# Patient Record
Sex: FEMALE | Race: WHITE | NOT HISPANIC OR LATINO | Employment: FULL TIME | URBAN - METROPOLITAN AREA
[De-identification: names, ages, dates, MRNs, and addresses within clinical notes are randomized per-mention and may not be internally consistent; named-entity substitution may affect disease eponyms.]

---

## 2018-08-14 ENCOUNTER — OFFICE VISIT (OUTPATIENT)
Dept: URGENT CARE | Facility: CLINIC | Age: 58
End: 2018-08-14
Payer: COMMERCIAL

## 2018-08-14 VITALS
OXYGEN SATURATION: 95 % | RESPIRATION RATE: 22 BRPM | TEMPERATURE: 104.1 F | SYSTOLIC BLOOD PRESSURE: 140 MMHG | WEIGHT: 212 LBS | DIASTOLIC BLOOD PRESSURE: 65 MMHG | BODY MASS INDEX: 36.19 KG/M2 | HEIGHT: 64 IN | HEART RATE: 115 BPM

## 2018-08-14 DIAGNOSIS — R50.9 FEVER, UNSPECIFIED FEVER CAUSE: ICD-10-CM

## 2018-08-14 DIAGNOSIS — B34.9 VIRAL INFECTION: ICD-10-CM

## 2018-08-14 DIAGNOSIS — H65.01 RIGHT ACUTE SEROUS OTITIS MEDIA, RECURRENCE NOT SPECIFIED: Primary | ICD-10-CM

## 2018-08-14 PROCEDURE — 99213 OFFICE O/P EST LOW 20 MIN: CPT | Performed by: EMERGENCY MEDICINE

## 2018-08-14 RX ORDER — DOXYCYCLINE 100 MG/1
100 TABLET ORAL 2 TIMES DAILY
Qty: 20 TABLET | Refills: 0 | Status: SHIPPED | OUTPATIENT
Start: 2018-08-14 | End: 2018-08-24

## 2018-08-14 RX ORDER — PREDNISONE 10 MG/1
TABLET ORAL
Qty: 27 TABLET | Refills: 0 | Status: SHIPPED | OUTPATIENT
Start: 2018-08-14

## 2018-08-14 RX ORDER — TRIAMCINOLONE ACETONIDE 55 UG/1
SPRAY, METERED NASAL
COMMUNITY

## 2018-08-14 RX ORDER — CETIRIZINE HYDROCHLORIDE 10 MG/1
10 TABLET ORAL DAILY
COMMUNITY

## 2018-08-14 RX ORDER — IBUPROFEN 600 MG/1
600 TABLET ORAL ONCE
Status: COMPLETED | OUTPATIENT
Start: 2018-08-14 | End: 2018-08-14

## 2018-08-14 RX ORDER — LISINOPRIL AND HYDROCHLOROTHIAZIDE 25; 20 MG/1; MG/1
1 TABLET ORAL DAILY
COMMUNITY

## 2018-08-14 RX ORDER — OMEPRAZOLE 20 MG/1
20 CAPSULE, DELAYED RELEASE ORAL DAILY
COMMUNITY

## 2018-08-14 RX ADMIN — IBUPROFEN 600 MG: 600 TABLET ORAL at 14:12

## 2018-08-14 NOTE — PATIENT INSTRUCTIONS
You have been diagnosed with a Viral Upper Respiratory infection and your symptoms should resolve over the next 7 to 10 days with the treatments recommended today  If they do not, it is possible that you have developed a bacterial infection and you should begin taking the antibiotic prescribed at that time  If you take the antibiotic while you are still in the viral stage, you will not get better any faster, but could kill off the good germs in your body as well as make the germs in you resistant to the antibiotic  Take an expectorant - guaifenesin should be the only ingredient - during the day, and the cough suppressant (ex  Robitussin DM or Tessalon) if needed at night only  Take Zinc 12 5 to 15 mg every 2 - 3 hrs while awake for the next few days  You may take Cold Bernabe (13 3 mg of Zinc) or split a 25 mg Zinc tablet or lozenge in two or a 50 mg into four to get the proper dose  The total daily dose of Zinc should exceed 75 mg per day  Avoid nonsteroidals like Advil or Aleve while on steroids (Prednisone)      Upper Respiratory Infection   AMBULATORY CARE:   An upper respiratory infection  is also called a common cold  It can affect your nose, throat, ears, and sinuses  Common signs and symptoms include the following:  Cold symptoms are usually worst for the first 3 to 5 days  You may have any of the following:  · Runny or stuffy nose    · Sneezing and coughing    · Sore throat or hoarseness    · Red, watery, and sore eyes    · Fatigue     · Chills and fever    · Headache, body aches, or sore muscles  Seek care immediately if:   · You have chest pain or trouble breathing  Contact your healthcare provider if:   · You have a fever over 102ºF (39°C)  · Your sore throat gets worse or you see white or yellow spots in your throat  · Your symptoms get worse after 3 to 5 days or your cold is not better in 14 days  · You have a rash anywhere on your skin      · You have large, tender lumps in your neck     · You have thick, green or yellow drainage from your nose  · You cough up thick yellow, green, or bloody mucus  · You have vomiting for more than 24 hours and cannot keep fluids down  · You have a bad earache  · You have questions or concerns about your condition or care  Treatment for a cold: There is no cure for the common cold  Colds are caused by viruses and do not get better with antibiotics  Most people get better in 7 to 14 days  You may continue to cough for 2 to 3 weeks  The following may help decrease your symptoms:  · Decongestants  help reduce nasal congestion and help you breathe more easily  If you take decongestant pills, they may make you feel restless or not able to sleep  Do not use decongestant sprays for more than a few days  · Cough suppressants  help reduce coughing  Ask your healthcare provider which type of cough medicine is best for you  · NSAIDs , such as ibuprofen, help decrease swelling, pain, and fever  NSAIDs can cause stomach bleeding or kidney problems in certain people  If you take blood thinner medicine, always ask your healthcare provider if NSAIDs are safe for you  Always read the medicine label and follow directions  · Acetaminophen  decreases pain and fever  It is available without a doctor's order  Ask how much to take and how often to take it  Follow directions  Read the labels of all other medicines you are using to see if they also contain acetaminophen, or ask your doctor or pharmacist  Acetaminophen can cause liver damage if not taken correctly  Do not use more than 4 grams (4,000 milligrams) total of acetaminophen in one day  Manage your cold:   · Rest as much as possible  Slowly start to do more each day  · Drink more liquids as directed  Liquids will help thin and loosen mucus so you can cough it up  Liquids will also help prevent dehydration  Liquids that help prevent dehydration include water, fruit juice, and broth   Do not drink liquids that contain caffeine  Caffeine can increase your risk for dehydration  Ask your healthcare provider how much liquid to drink each day  · Soothe a sore throat  Gargle with warm salt water  This helps your sore throat feel better  Make salt water by dissolving ¼ teaspoon salt in 1 cup warm water  You may also suck on hard candy or throat lozenges  You may use a sore throat spray  · Use a humidifier or vaporizer  Use a cool mist humidifier or a vaporizer to increase air moisture in your home  This may make it easier for you to breathe and help decrease your cough  · Use saline nasal drops as directed  These help relieve congestion  · Apply petroleum-based jelly around the outside of your nostrils  This can decrease irritation from blowing your nose  · Do not smoke  Nicotine and other chemicals in cigarettes and cigars can make your symptoms worse  They can also cause infections such as bronchitis or pneumonia  Ask your healthcare provider for information if you currently smoke and need help to quit  E-cigarettes or smokeless tobacco still contain nicotine  Talk to your healthcare provider before you use these products  Prevent spreading your cold to others:   · Try to stay away from other people during the first 2 to 3 days of your cold when it is more easily spread  · Do not share food or drinks  · Do not share hand towels with household members  · Wash your hands often, especially after you blow your nose  Turn away from other people and cover your mouth and nose with a tissue when you sneeze or cough  Follow up with your healthcare provider as directed:  Write down your questions so you remember to ask them during your visits  © 2017 [employer] Information is for End User's use only and may not be sold, redistributed or otherwise used for commercial purposes   All illustrations and images included in CareNotes® are the copyrighted property of [employer]  or [employer]  The above information is an  only  It is not intended as medical advice for individual conditions or treatments  Talk to your doctor, nurse or pharmacist before following any medical regimen to see if it is safe and effective for you  Fever in Adults   AMBULATORY CARE:   A fever  is an increase in your body temperature  Normal body temperature is 98 6°F (37°C)  Fever is generally defined as greater than 100 4°F (38°C)  Common causes include an infection, injury, or disease such as arthritis  Other signs and symptoms may include any of the following:   · Chills and shivers     · Muscle stiffness    · Weight loss    · Night sweats    · Fever that comes and goes    · Fever that is higher in the morning  Seek care immediately if:   · Your fever does not go away or gets worse even after treatment  · You have a stiff neck and a bad headache  · You are confused  You may not be able to think clearly or remember things like you normally do  · Your heart beats faster than usual even after treatment  · You have shortness of breath or chest pain when you breathe  · You urinate small amounts or not at all  · Your skin, lips, or nails turn blue  Contact your healthcare provider if:   · You have abdominal pain or you feel bloated  · You have nausea or are vomiting  · You have pain or burning when you urinate, or you have pain in your back  · You have questions or concerns about your condition or care  Treatment for a fever  may include any of the following:  · NSAIDs , such as ibuprofen, help decrease swelling, pain, and fever  This medicine is available with or without a doctor's order  NSAIDs can cause stomach bleeding or kidney problems in certain people  If you take blood thinner medicine, always ask if NSAIDs are safe for you  Always read the medicine label and follow directions   Do not give these medicines to children under 6 months of age without direction from your child's healthcare provider  · Acetaminophen  decreases pain and fever  It is available without a doctor's order  Ask how much to take and how often to take it  Follow directions  Read the labels of all other medicines you are using to see if they also contain acetaminophen, or ask your doctor or pharmacist  Acetaminophen can cause liver damage if not taken correctly  Do not use more than 4 grams (4,000 milligrams) total of acetaminophen in one day  · Antibiotics  may be given if you have an infection caused by bacteria  · Take your medicine as directed  Contact your healthcare provider if you think your medicine is not helping or if you have side effects  Tell him of her if you are allergic to any medicine  Keep a list of the medicines, vitamins, and herbs you take  Include the amounts, and when and why you take them  Bring the list or the pill bottles to follow-up visits  Carry your medicine list with you in case of an emergency  Self-care:   · Drink more liquids as directed  A fever makes you sweat  This can increase your risk for dehydration  Liquids can help prevent dehydration  ¨ Drink at least 6 to 8 eight-ounce cups of clear liquids each day  Drink water, juice, or broth  Do not drink sports drinks  They may contain caffeine  ¨ Ask your healthcare provider if you should drink an oral rehydration solution (ORS)  An ORS has the right amounts of water, salts, and sugar you need to replace body fluids  · Dress in lightweight clothes  Shivers may be a sign that your fever is rising  Do not put extra blankets or clothes on  This may cause your fever to rise even higher  Dress in light, comfortable clothing  Use a lightweight blanket or sheet when you sleep  Change your clothes, blanket, or sheets if they get wet  · Cool yourself safely  Take a bath in cool or lukewarm water   Use an ice pack wrapped in a small towel or wet a washcloth with cool water  Place the ice pack or wet washcloth on your forehead or the back of your neck  Follow up with your healthcare provider as directed:  Write down your questions so you remember to ask them during your visits  © 2017 [employer] Information is for End User's use only and may not be sold, redistributed or otherwise used for commercial purposes  All illustrations and images included in CareNotes® are the copyrighted property of [employer]  or [employer]  The above information is an  only  It is not intended as medical advice for individual conditions or treatments  Talk to your doctor, nurse or pharmacist before following any medical regimen to see if it is safe and effective for you  Serous Otitis Media   WHAT YOU NEED TO KNOW:   Serous otitis media is fluid trapped behind your tympanic membrane (eardrum), without an ear infection  Your eardrum is in your middle ear  Serous otitis media is also called otitis media with effusion  You may have fluid in your ear for months, but it usually goes away on its own  The fluid may be in one or both ears  The fluid may cause muffled sounds, and you may feel like your ears are full  Serous otitis media may be caused by an upper respiratory infection or allergies  It is most common in the fall and early spring  DISCHARGE INSTRUCTIONS:   Return to the emergency department if:   · You have a fever  · You have a sudden loss of hearing in your affected ear  · You develop a severe headache and stiff neck  · You have a seizure  Contact your healthcare provider if:   · You have fluid draining from your ear  · You have new symptoms  · You have questions or concerns about your condition or care  Follow up with your healthcare provider as directed: Your ears will need to be checked regularly  You may need to see a specialist  Write down your questions so you remember to ask them during your visits    © 2017 [employer] Information is for End User's use only and may not be sold, redistributed or otherwise used for commercial purposes  All illustrations and images included in CareNotes® are the copyrighted property of [employer]  or [employer]  The above information is an  only  It is not intended as medical advice for individual conditions or treatments  Talk to your doctor, nurse or pharmacist before following any medical regimen to see if it is safe and effective for you

## 2018-08-14 NOTE — PROGRESS NOTES
[id number]        NAME: Cynthia Boyd is a 62 y o  female  : 1960    MRN: [id number]  DATE: 2018  TIME: 6:00 PM    Assessment and Plan   Right acute serous otitis media, recurrence not specified [H65 01]  1  Right acute serous otitis media, recurrence not specified  doxycycline (ADOXA) 100 MG tablet   2  Fever, unspecified fever cause  ibuprofen (MOTRIN) tablet 600 mg   3  Viral infection  predniSONE 10 mg tablet         Patient Instructions     Patient Instructions     You have been diagnosed with a Viral Upper Respiratory infection and your symptoms should resolve over the next 7 to 10 days with the treatments recommended today  If they do not, it is possible that you have developed a bacterial infection and you should begin taking the antibiotic prescribed at that time  If you take the antibiotic while you are still in the viral stage, you will not get better any faster, but could kill off the good germs in your body as well as make the germs in you resistant to the antibiotic  Take an expectorant - guaifenesin should be the only ingredient - during the day, and the cough suppressant (ex  Robitussin DM or Tessalon) if needed at night only  Take Zinc 12 5 to 15 mg every 2 - 3 hrs while awake for the next few days  You may take Cold Bernabe (13 3 mg of Zinc) or split a 25 mg Zinc tablet or lozenge in two or a 50 mg into four to get the proper dose  The total daily dose of Zinc should exceed 75 mg per day  Avoid nonsteroidals like Advil or Aleve while on steroids (Prednisone)      Upper Respiratory Infection   AMBULATORY CARE:   An upper respiratory infection  is also called a common cold  It can affect your nose, throat, ears, and sinuses  Common signs and symptoms include the following:  Cold symptoms are usually worst for the first 3 to 5 days   You may have any of the following:  · Runny or stuffy nose    · Sneezing and coughing    · Sore throat or hoarseness    · Red, watery, and sore eyes    · Fatigue     · Chills and fever    · Headache, body aches, or sore muscles  Seek care immediately if:   · You have chest pain or trouble breathing  Contact your healthcare provider if:   · You have a fever over 102ºF (39°C)  · Your sore throat gets worse or you see white or yellow spots in your throat  · Your symptoms get worse after 3 to 5 days or your cold is not better in 14 days  · You have a rash anywhere on your skin  · You have large, tender lumps in your neck  · You have thick, green or yellow drainage from your nose  · You cough up thick yellow, green, or bloody mucus  · You have vomiting for more than 24 hours and cannot keep fluids down  · You have a bad earache  · You have questions or concerns about your condition or care  Treatment for a cold: There is no cure for the common cold  Colds are caused by viruses and do not get better with antibiotics  Most people get better in 7 to 14 days  You may continue to cough for 2 to 3 weeks  The following may help decrease your symptoms:  · Decongestants  help reduce nasal congestion and help you breathe more easily  If you take decongestant pills, they may make you feel restless or not able to sleep  Do not use decongestant sprays for more than a few days  · Cough suppressants  help reduce coughing  Ask your healthcare provider which type of cough medicine is best for you  · NSAIDs , such as ibuprofen, help decrease swelling, pain, and fever  NSAIDs can cause stomach bleeding or kidney problems in certain people  If you take blood thinner medicine, always ask your healthcare provider if NSAIDs are safe for you  Always read the medicine label and follow directions  · Acetaminophen  decreases pain and fever  It is available without a doctor's order  Ask how much to take and how often to take it  Follow directions   Read the labels of all other medicines you are using to see if they also contain acetaminophen, or ask your doctor or pharmacist  Acetaminophen can cause liver damage if not taken correctly  Do not use more than 4 grams (4,000 milligrams) total of acetaminophen in one day  Manage your cold:   · Rest as much as possible  Slowly start to do more each day  · Drink more liquids as directed  Liquids will help thin and loosen mucus so you can cough it up  Liquids will also help prevent dehydration  Liquids that help prevent dehydration include water, fruit juice, and broth  Do not drink liquids that contain caffeine  Caffeine can increase your risk for dehydration  Ask your healthcare provider how much liquid to drink each day  · Soothe a sore throat  Gargle with warm salt water  This helps your sore throat feel better  Make salt water by dissolving ¼ teaspoon salt in 1 cup warm water  You may also suck on hard candy or throat lozenges  You may use a sore throat spray  · Use a humidifier or vaporizer  Use a cool mist humidifier or a vaporizer to increase air moisture in your home  This may make it easier for you to breathe and help decrease your cough  · Use saline nasal drops as directed  These help relieve congestion  · Apply petroleum-based jelly around the outside of your nostrils  This can decrease irritation from blowing your nose  · Do not smoke  Nicotine and other chemicals in cigarettes and cigars can make your symptoms worse  They can also cause infections such as bronchitis or pneumonia  Ask your healthcare provider for information if you currently smoke and need help to quit  E-cigarettes or smokeless tobacco still contain nicotine  Talk to your healthcare provider before you use these products  Prevent spreading your cold to others:   · Try to stay away from other people during the first 2 to 3 days of your cold when it is more easily spread  · Do not share food or drinks  · Do not share hand towels with household members      · Wash your hands often, especially after you blow your nose  Turn away from other people and cover your mouth and nose with a tissue when you sneeze or cough  Follow up with your healthcare provider as directed:  Write down your questions so you remember to ask them during your visits  © 2017 [employer] Information is for End User's use only and may not be sold, redistributed or otherwise used for commercial purposes  All illustrations and images included in CareNotes® are the copyrighted property of [employer]  or [employer]  The above information is an  only  It is not intended as medical advice for individual conditions or treatments  Talk to your doctor, nurse or pharmacist before following any medical regimen to see if it is safe and effective for you  Fever in Adults   AMBULATORY CARE:   A fever  is an increase in your body temperature  Normal body temperature is 98 6°F (37°C)  Fever is generally defined as greater than 100 4°F (38°C)  Common causes include an infection, injury, or disease such as arthritis  Other signs and symptoms may include any of the following:   · Chills and shivers     · Muscle stiffness    · Weight loss    · Night sweats    · Fever that comes and goes    · Fever that is higher in the morning  Seek care immediately if:   · Your fever does not go away or gets worse even after treatment  · You have a stiff neck and a bad headache  · You are confused  You may not be able to think clearly or remember things like you normally do  · Your heart beats faster than usual even after treatment  · You have shortness of breath or chest pain when you breathe  · You urinate small amounts or not at all  · Your skin, lips, or nails turn blue  Contact your healthcare provider if:   · You have abdominal pain or you feel bloated  · You have nausea or are vomiting  · You have pain or burning when you urinate, or you have pain in your back      · You have questions or concerns about your condition or care  Treatment for a fever  may include any of the following:  · NSAIDs , such as ibuprofen, help decrease swelling, pain, and fever  This medicine is available with or without a doctor's order  NSAIDs can cause stomach bleeding or kidney problems in certain people  If you take blood thinner medicine, always ask if NSAIDs are safe for you  Always read the medicine label and follow directions  Do not give these medicines to children under 10months of age without direction from your child's healthcare provider  · Acetaminophen  decreases pain and fever  It is available without a doctor's order  Ask how much to take and how often to take it  Follow directions  Read the labels of all other medicines you are using to see if they also contain acetaminophen, or ask your doctor or pharmacist  Acetaminophen can cause liver damage if not taken correctly  Do not use more than 4 grams (4,000 milligrams) total of acetaminophen in one day  · Antibiotics  may be given if you have an infection caused by bacteria  · Take your medicine as directed  Contact your healthcare provider if you think your medicine is not helping or if you have side effects  Tell him of her if you are allergic to any medicine  Keep a list of the medicines, vitamins, and herbs you take  Include the amounts, and when and why you take them  Bring the list or the pill bottles to follow-up visits  Carry your medicine list with you in case of an emergency  Self-care:   · Drink more liquids as directed  A fever makes you sweat  This can increase your risk for dehydration  Liquids can help prevent dehydration  ¨ Drink at least 6 to 8 eight-ounce cups of clear liquids each day  Drink water, juice, or broth  Do not drink sports drinks  They may contain caffeine  ¨ Ask your healthcare provider if you should drink an oral rehydration solution (ORS)   An ORS has the right amounts of water, salts, and sugar you need to replace body fluids  · Dress in lightweight clothes  Shivers may be a sign that your fever is rising  Do not put extra blankets or clothes on  This may cause your fever to rise even higher  Dress in light, comfortable clothing  Use a lightweight blanket or sheet when you sleep  Change your clothes, blanket, or sheets if they get wet  · Cool yourself safely  Take a bath in cool or lukewarm water  Use an ice pack wrapped in a small towel or wet a washcloth with cool water  Place the ice pack or wet washcloth on your forehead or the back of your neck  Follow up with your healthcare provider as directed:  Write down your questions so you remember to ask them during your visits  © 2017 [address] [facility] Information is for End User's use only and may not be sold, redistributed or otherwise used for commercial purposes  All illustrations and images included in CareNotes® are the copyrighted property of A [employer] M , Inc  or [employer]  The above information is an  only  It is not intended as medical advice for individual conditions or treatments  Talk to your doctor, nurse or pharmacist before following any medical regimen to see if it is safe and effective for you  Serous Otitis Media   WHAT YOU NEED TO KNOW:   Serous otitis media is fluid trapped behind your tympanic membrane (eardrum), without an ear infection  Your eardrum is in your middle ear  Serous otitis media is also called otitis media with effusion  You may have fluid in your ear for months, but it usually goes away on its own  The fluid may be in one or both ears  The fluid may cause muffled sounds, and you may feel like your ears are full  Serous otitis media may be caused by an upper respiratory infection or allergies  It is most common in the fall and early spring  DISCHARGE INSTRUCTIONS:   Return to the emergency department if:   · You have a fever      · You have a sudden loss of hearing in your affected ear  · You develop a severe headache and stiff neck  · You have a seizure  Contact your healthcare provider if:   · You have fluid draining from your ear  · You have new symptoms  · You have questions or concerns about your condition or care  Follow up with your healthcare provider as directed: Your ears will need to be checked regularly  You may need to see a specialist  Write down your questions so you remember to ask them during your visits  © 2017 [address] [employer] Information is for End User's use only and may not be sold, redistributed or otherwise used for commercial purposes  All illustrations and images included in CareNotes® are the copyrighted property of A D [employer] M , Inc  or [employer]  The above information is an  only  It is not intended as medical advice for individual conditions or treatments  Talk to your doctor, nurse or pharmacist before following any medical regimen to see if it is safe and effective for you  Follow up with PCP in 3-5 days  Proceed to  ER if symptoms worsen  Chief Complaint     Chief Complaint   Patient presents with    Sore Throat     Sore throat, right earache, dry cough, headaches, chills  Onset 2 days ago  History of Present Illness       Patient complains of sore throat, fever, chills, headache, right ear pain and generalized aches for the past 2 days  Review of Systems   Review of Systems   Constitutional: Positive for chills and fever  HENT: Positive for congestion, ear pain, rhinorrhea, sinus pressure and sore throat  Negative for ear discharge, trouble swallowing and voice change  Respiratory: Positive for cough  Negative for chest tightness, shortness of breath and wheezing  Cardiovascular: Negative for chest pain           Current Medications       Current Outpatient Prescriptions:     cetirizine (ZyrTEC) 10 mg tablet, Take 10 mg by mouth daily, Disp: , Rfl:    lisinopril-hydrochlorothiazide (PRINZIDE,ZESTORETIC) 20-25 MG per tablet, Take 1 tablet by mouth daily, Disp: , Rfl:     omeprazole (PriLOSEC) 20 mg delayed release capsule, Take 20 mg by mouth daily, Disp: , Rfl:     Triamcinolone Acetonide (NASACORT ALLERGY 24HR) 55 MCG/ACT AERO, into each nostril, Disp: , Rfl:     doxycycline (ADOXA) 100 MG tablet, Take 1 tablet (100 mg total) by mouth 2 (two) times a day for 10 days, Disp: 20 tablet, Rfl: 0    predniSONE 10 mg tablet, Take once daily all days pills on this schedule 6- 6- 5- 4- 3- 2- 1, Disp: 27 tablet, Rfl: 0  No current facility-administered medications for this visit  Current Allergies     Allergies as of 08/14/2018 - Reviewed 08/14/2018   Allergen Reaction Noted    Penicillins Hives 08/14/2018            The following portions of the patient's history were reviewed and updated as appropriate: allergies, current medications, past family history, past medical history, past social history, past surgical history and problem list      Past Medical History:   Diagnosis Date    GERD (gastroesophageal reflux disease)     Hypertension        Past Surgical History:   Procedure Laterality Date    COLONOSCOPY  2015    ENDOMETRIAL ABLATION  2008 [facility] [facility]       History reviewed  No pertinent family history  Medications have been verified  Objective   /65   Pulse (!) 115   Temp (!) 104 1 °F (40 1 °C) (Tympanic)   Resp 22   Ht 5' 4" (1 626 m)   Wt 96 2 kg (212 lb)   SpO2 95%   BMI 36 39 kg/m²        Physical Exam     Physical Exam   Constitutional: She is oriented to person, place, and time  She appears well-developed and well-nourished  No distress  HENT:   Head: Normocephalic and atraumatic  Right Ear: External ear normal  No tenderness  Tympanic membrane is not injected and not erythematous  A middle ear effusion is present  Left Ear: Tympanic membrane and external ear normal  No tenderness   Tympanic membrane is not injected and not erythematous  No middle ear effusion  Nose: Mucosal edema present  Mouth/Throat: Posterior oropharyngeal erythema present  No oropharyngeal exudate or tonsillar abscesses  Neck: Neck supple  Cardiovascular: Normal rate and regular rhythm  Pulmonary/Chest: Effort normal    Abdominal: Soft  Bowel sounds are normal    Neurological: She is alert and oriented to person, place, and time  Skin: Skin is warm and dry  Nursing note and vitals reviewed

## 2019-02-04 ENCOUNTER — OFFICE VISIT (OUTPATIENT)
Dept: URGENT CARE | Facility: CLINIC | Age: 59
End: 2019-02-04
Payer: COMMERCIAL

## 2019-02-04 VITALS
DIASTOLIC BLOOD PRESSURE: 72 MMHG | HEIGHT: 64 IN | OXYGEN SATURATION: 99 % | BODY MASS INDEX: 27.14 KG/M2 | TEMPERATURE: 97.5 F | SYSTOLIC BLOOD PRESSURE: 118 MMHG | RESPIRATION RATE: 20 BRPM | WEIGHT: 159 LBS

## 2019-02-04 DIAGNOSIS — H10.32 ACUTE BACTERIAL CONJUNCTIVITIS OF LEFT EYE: Primary | ICD-10-CM

## 2019-02-04 PROCEDURE — 99203 OFFICE O/P NEW LOW 30 MIN: CPT | Performed by: PHYSICIAN ASSISTANT

## 2019-02-04 RX ORDER — TOBRAMYCIN 3 MG/ML
1 SOLUTION/ DROPS OPHTHALMIC
Qty: 5 ML | Refills: 0 | Status: SHIPPED | OUTPATIENT
Start: 2019-02-04

## 2019-02-04 RX ORDER — METFORMIN HYDROCHLORIDE 500 MG/1
TABLET, EXTENDED RELEASE ORAL
COMMUNITY
Start: 2018-11-18

## 2019-02-04 RX ORDER — LISINOPRIL 10 MG/1
10 TABLET ORAL DAILY
COMMUNITY

## 2019-02-04 NOTE — PATIENT INSTRUCTIONS
Conjunctivitis   AMBULATORY CARE:   Conjunctivitis,  or pink eye, is inflammation of your conjunctiva  The conjunctiva is a thin tissue that covers the front of your eye and the back of your eyelids  The conjunctiva helps protect your eye and keep it moist  Conjunctivitis may be caused by bacteria, allergies, or a virus  If your conjunctivitis is caused by bacteria, it may get better on its own in about 7 days  Viral conjunctivitis can last up to 3 weeks  Common symptoms may include any of the following: You will usually have symptoms in both eyes if your conjunctivitis is caused by allergies  You may also have other allergic symptoms, such as a rash or runny nose  Symptoms will usually start in 1 eye if your conjunctivitis is caused by a virus or bacteria  · Redness in the whites of your eye    · Itching in your eye or around your eye    · Feeling like there is something in your eye    · Watery or thick, sticky discharge    · Crusty eyelids when you wake up in the morning    · Burning, stinging, or swelling in your eye    · Pain when you see bright light  Seek care immediately if:   · You have worsening eye pain  · The swelling in your eye gets worse, even after treatment  · Your vision suddenly becomes worse or you cannot see at all  Contact your healthcare provider if:   · You develop a fever and ear pain  · You have tiny bumps or spots of blood on your eye  · You have questions or concerns about your condition or care  Treatment  will depend on the cause of your conjunctivitis  You may need antibiotics or allergy medicine as a pill, eye drop, or eye ointment  Manage your symptoms:   · Apply a cool compress  Wet a washcloth with cold water and place it on your eye  This will help decrease itching and irritation  · Do not wear contact lenses  They can irritate your eye  Throw away the pair you are using and ask when you can wear them again   Use a new pair of lenses when your healthcare provider says it is okay  · Avoid irritants  Stay away from smoke filled areas  Shield your eyes from wind and sun  · Flush your eye  You may need to flush your eye with saline to help decrease your symptoms  Ask for more information on how to flush your eye  Medicines:  Treatment depends on what is causing your conjunctivitis  You may be given any of the following:  · Allergy medicine  helps decrease itchy, red, swollen eyes caused by allergies  It may be given as a pill, eye drops, or nasal spray  · Antibiotics  may be needed if your conjunctivitis is caused by bacteria  This medicine may be given as a pill, eye drops, or eye ointment  · Take your medicine as directed  Contact your healthcare provider if you think your medicine is not helping or if you have side effects  Tell him or her if you are allergic to any medicine  Keep a list of the medicines, vitamins, and herbs you take  Include the amounts, and when and why you take them  Bring the list or the pill bottles to follow-up visits  Carry your medicine list with you in case of an emergency  Prevent the spread of conjunctivitis:   · Wash your hands with soap and water often  Wash your hands before and after you touch your eyes  Also wash your hands before you prepare or eat food and after you use the bathroom or change a diaper  · Avoid allergens  Try to avoid the things that cause your allergies, such as pets, dust, or grass  · Avoid contact with others  Do not share towels or washcloths  Try to stay away from others as much as possible  Ask when you can return to work or school  · Throw away eye makeup  The bacteria that caused your conjunctivitis can stay in eye makeup  Throw away mascara and other eye makeup  © 2017 [address] [employer]  Information is for End User's use only and may not be sold, redistributed or otherwise used for commercial purposes   All illustrations and images included in [facility] are the copyrighted property of [employer]  or [employer]  The above information is an  only  It is not intended as medical advice for individual conditions or treatments  Talk to your doctor, nurse or pharmacist before following any medical regimen to see if it is safe and effective for you

## 2019-02-04 NOTE — PROGRESS NOTES
[email or web address]        NAME: Kay Mims is a 62 y o  female  : 1960    MRN: [id number]  DATE: 2019  TIME: 5:24 PM    Assessment and Plan   Acute bacterial conjunctivitis of left eye [H10 32]  1  Acute bacterial conjunctivitis of left eye  tobramycin (TOBREX) 0 3 % SOLN         Patient Instructions       Follow up with PCP in 3-5 days  Proceed to  ER if symptoms worsen  Chief Complaint     Chief Complaint   Patient presents with    Eye Pain     left eye is red and itchy since this am         History of Present Illness       Patient started with acute onset of redness and drainage from her left eye at 1:00 p m this afternoon  She was exposed to a pinkeye through a co-worker as well as her granddaughter  She denies any changes in her vision piercing pain through the eye or swelling  Review of Systems   Review of Systems   Constitutional: Negative for fever  HENT: Negative for sore throat  Eyes: Positive for discharge and redness  Negative for photophobia, pain and visual disturbance  Respiratory: Negative for cough  Gastrointestinal: Negative for nausea and vomiting  Musculoskeletal: Negative for myalgias  Skin: Negative for rash  Neurological: Negative for light-headedness  Hematological: Negative for adenopathy           Current Medications       Current Outpatient Prescriptions:     cetirizine (ZyrTEC) 10 mg tablet, Take 10 mg by mouth daily, Disp: , Rfl:     lisinopril (ZESTRIL) 10 mg tablet, Take 10 mg by mouth daily, Disp: , Rfl:     metFORMIN (GLUCOPHAGE-XR) 500 mg 24 hr tablet, TAKE 2 TABLETS EVERY DAY, Disp: , Rfl:     omeprazole (PriLOSEC) 20 mg delayed release capsule, Take 20 mg by mouth daily, Disp: , Rfl:     lisinopril-hydrochlorothiazide (PRINZIDE,ZESTORETIC) 20-25 MG per tablet, Take 1 tablet by mouth daily, Disp: , Rfl:     predniSONE 10 mg tablet, Take once daily all days pills on this schedule 6- 6- 5- 4- 3- 2- 1 (Patient not taking: Reported on 2/4/2019 ), Disp: 27 tablet, Rfl: 0    tobramycin (TOBREX) 0 3 % SOLN, Administer 1 drop into the left eye every 4 (four) hours while awake, Disp: 5 mL, Rfl: 0    Triamcinolone Acetonide (NASACORT ALLERGY 24HR) 55 MCG/ACT AERO, into each nostril, Disp: , Rfl:     Current Allergies     Allergies as of 02/04/2019 - Reviewed 02/04/2019   Allergen Reaction Noted    Penicillins Hives 08/14/2018            The following portions of the patient's history were reviewed and updated as appropriate: allergies, current medications, past family history, past medical history, past social history, past surgical history and problem list      Past Medical History:   Diagnosis Date    GERD (gastroesophageal reflux disease)     Hypertension        Past Surgical History:   Procedure Laterality Date    COLONOSCOPY  2015    ENDOMETRIAL ABLATION  2008   [address]       History reviewed  No pertinent family history  Medications have been verified  Objective   /72   Temp 97 5 °F (36 4 °C) (Tympanic)   Resp 20   Ht 5' 4" (1 626 m)   Wt 72 1 kg (159 lb)   SpO2 99%   BMI 27 29 kg/m²        Physical Exam     Physical Exam   Constitutional: She is oriented to person, place, and time  She appears well-developed and well-nourished  HENT:   Head: Normocephalic and atraumatic  Eyes: Pupils are equal, round, and reactive to light  EOM are normal    Left conjunctiva hyperemic with purulent drainage from left eye  Mild matting of the eyelashes  Neck: Neck supple  Cardiovascular: Normal rate and regular rhythm  Pulmonary/Chest: Effort normal    Lymphadenopathy:     She has no cervical adenopathy  Neurological: She is alert and oriented to person, place, and time  Skin: Skin is warm and dry  No rash noted  Psychiatric: She has a normal mood and affect  Her behavior is normal  Judgment and thought content normal    Nursing note and vitals reviewed

## 2020-01-25 ENCOUNTER — OFFICE VISIT (OUTPATIENT)
Dept: URGENT CARE | Facility: CLINIC | Age: 60
End: 2020-01-25
Payer: COMMERCIAL

## 2020-01-25 VITALS
HEIGHT: 63 IN | RESPIRATION RATE: 16 BRPM | OXYGEN SATURATION: 98 % | HEART RATE: 70 BPM | BODY MASS INDEX: 26.4 KG/M2 | SYSTOLIC BLOOD PRESSURE: 142 MMHG | DIASTOLIC BLOOD PRESSURE: 80 MMHG | WEIGHT: 149 LBS

## 2020-01-25 DIAGNOSIS — S00.03XA CONTUSION OF SCALP, INITIAL ENCOUNTER: Primary | ICD-10-CM

## 2020-01-25 DIAGNOSIS — S09.90XA HEAD TRAUMA, INITIAL ENCOUNTER: ICD-10-CM

## 2020-01-25 PROCEDURE — G0381 LEV 2 HOSP TYPE B ED VISIT: HCPCS | Performed by: EMERGENCY MEDICINE

## 2020-01-25 PROCEDURE — 99282 EMERGENCY DEPT VISIT SF MDM: CPT | Performed by: EMERGENCY MEDICINE

## 2020-01-25 NOTE — PROGRESS NOTES
[facility]        NAME: Sully Barrientos is a 61 y o  female  : 1960    MRN: [id number]  DATE: 2020  TIME: 2:27 PM    Assessment and Plan   Contusion of scalp, initial encounter [S00 03XA]  1  Contusion of scalp, initial encounter     2  Head trauma, initial encounter           Patient Instructions     Patient Instructions   1  Ice to scalp area:  10 min on 10 mins off three times daily  2  If onset of blurred vision, nausea, vomiting, instability of gait, worsening headache, go directly to ER  Contusion in Adults   WHAT YOU NEED TO KNOW:   A contusion is a bruise that appears on your skin after an injury  A bruise happens when small blood vessels tear but skin does not  When blood vessels tear, blood leaks into nearby tissue, such as soft tissue or muscle  DISCHARGE INSTRUCTIONS:   Return to the emergency department if:   · You have new trouble moving the injured area  · You have tingling or numbness in or near the injured area  · Your hand or foot below the bruise gets cold or turns pale  Contact your healthcare provider if:   · You find a new lump in the injured area  · Your symptoms do not improve with treatment after 4 to 5 days  · You have questions or concerns about your condition or care  Medicines: You may need any of the following:  · NSAIDs  help decrease swelling and pain or fever  This medicine is available with or without a doctor's order  NSAIDs can cause stomach bleeding or kidney problems in certain people  If you take blood thinner medicine, always ask your healthcare provider if NSAIDs are safe for you  Always read the medicine label and follow directions  · Prescription pain medicine  may be given  Do not wait until the pain is severe before you take your medicine  · Take your medicine as directed  Contact your healthcare provider if you think your medicine is not helping or if you have side effects   Tell him of her if you are allergic to any medicine  Keep a list of the medicines, vitamins, and herbs you take  Include the amounts, and when and why you take them  Bring the list or the pill bottles to follow-up visits  Carry your medicine list with you in case of an emergency  Follow up with your healthcare provider as directed: You may need to return within a week to check your injury again  Write down your questions so you remember to ask them during your visits  Help a contusion heal:   · Rest the injured area  or use it less than usual  If you bruised your leg or foot, you may need crutches or a cane to help you walk  This will help you keep weight off your injured body part  · Apply ice  to decrease swelling and pain  Ice may also help prevent tissue damage  Use an ice pack, or put crushed ice in a plastic bag  Cover it with a towel and place it on your bruise for 15 to 20 minutes every hour or as directed  · Use compression  to support the area and decrease swelling  Wrap an elastic bandage around the area over the bruised muscle  Make sure the bandage is not too tight  You should be able to fit 1 finger between the bandage and your skin  · Elevate (raise) your injured body part  above the level of your heart to help decrease pain and swelling  Use pillows, blankets, or rolled towels to elevate the area as often as you can  · Do not drink alcohol  as directed  Alcohol may slow healing  · Do not stretch injured muscles  right after your injury  Ask your healthcare provider when and how you may safely stretch after your injury  Gentle stretches can help increase your flexibility  · Do not massage the area or put heating pads  on the bruise right after your injury  Heat and massage may slow healing  Your healthcare provider may tell you to apply heat after several days  At that time, heat will start to help the injury heal   Prevent another contusion:   · Stretch and warm up before you play sports or exercise      · Wear protective gear when you play sports  Examples are shin guards and padding  · If you begin a new physical activity, start slowly to give your body a chance to adjust   © 2017 [employer] Information is for End User's use only and may not be sold, redistributed or otherwise used for commercial purposes  All illustrations and images included in CareNotes® are the copyrighted property of [employer]  or [employer]  The above information is an  only  It is not intended as medical advice for individual conditions or treatments  Talk to your doctor, nurse or pharmacist before following any medical regimen to see if it is safe and effective for you  Follow up with PCP in 3-5 days  Proceed to  ER if symptoms worsen  Chief Complaint     Chief Complaint   Patient presents with    Headache         History of Present Illness       This is a 15-year-old female who presents with headache and the left occipital area which started after she hit her head on the corner edge of her television about 6 days ago  She states she still has a minimal swelling in the area when she touches it it hurts  Her headache has much improved although she still has a headache today at the level of 2  Patient denies nausea vomiting  She had no loss of consciousness at the time of the injury  She states that Tylenol does help the pain  Review of Systems   Review of Systems   Constitutional: Negative for fever  HENT: Negative for congestion  Eyes: Negative for discharge and redness  Respiratory: Negative for cough  Gastrointestinal: Negative for nausea and vomiting  Musculoskeletal: Negative for arthralgias, myalgias, neck pain and neck stiffness  Skin: Negative for rash  Neurological: Positive for headaches  Negative for dizziness, speech difficulty, weakness, light-headedness and numbness           Current Medications       Current Outpatient Medications:    cetirizine (ZyrTEC) 10 mg tablet, Take 10 mg by mouth daily, Disp: , Rfl:     metFORMIN (GLUCOPHAGE-XR) 500 mg 24 hr tablet, TAKE 2 TABLETS EVERY DAY, Disp: , Rfl:     omeprazole (PriLOSEC) 20 mg delayed release capsule, Take 20 mg by mouth daily, Disp: , Rfl:     Triamcinolone Acetonide (NASACORT ALLERGY 24HR) 55 MCG/ACT AERO, into each nostril, Disp: , Rfl:     lisinopril (ZESTRIL) 10 mg tablet, Take 10 mg by mouth daily, Disp: , Rfl:     lisinopril-hydrochlorothiazide (PRINZIDE,ZESTORETIC) 20-25 MG per tablet, Take 1 tablet by mouth daily, Disp: , Rfl:     predniSONE 10 mg tablet, Take once daily all days pills on this schedule 6- 6- 5- 4- 3- 2- 1 (Patient not taking: Reported on 2/4/2019 ), Disp: 27 tablet, Rfl: 0    tobramycin (TOBREX) 0 3 % SOLN, Administer 1 drop into the left eye every 4 (four) hours while awake (Patient not taking: Reported on 1/25/2020), Disp: 5 mL, Rfl: 0    Current Allergies     Allergies as of 01/25/2020 - Reviewed 01/25/2020   Allergen Reaction Noted    Penicillins Hives 08/14/2018            The following portions of the patient's history were reviewed and updated as appropriate: allergies, current medications, past family history, past medical history, past social history, past surgical history and problem list      Past Medical History:   Diagnosis Date    GERD (gastroesophageal reflux disease)     Hypertension        Past Surgical History:   Procedure Laterality Date    COLONOSCOPY  2015    ENDOMETRIAL ABLATION  2008 [facility]       History reviewed  No pertinent family history  Medications have been verified  Objective   /80 (BP Location: Left arm, Patient Position: Sitting, Cuff Size: Standard)   Pulse 70   Resp 16   Ht 5' 3" (1 6 m)   Wt 67 6 kg (149 lb)   SpO2 98%   BMI 26 39 kg/m²        Physical Exam     Physical Exam   Constitutional: She is oriented to person, place, and time  She appears well-developed and well-nourished  HENT:   Head: Normocephalic and atraumatic  Right Ear: External ear normal    Left Ear: External ear normal    Nose: Nose normal    Mouth/Throat: Oropharynx is clear and moist    There is a slight swelling to the scalp on the left posterior parietal area which is tender to palpation and reproduces the patient's pain  There is no bruising  There are no abrasions  Eyes: Pupils are equal, round, and reactive to light  Conjunctivae and EOM are normal    Bilateral funduscopic exam is normal    Neck: Normal range of motion  Neck supple  Cardiovascular: Normal rate, regular rhythm and normal heart sounds  No murmur heard  Pulmonary/Chest: Effort normal and breath sounds normal  She has no wheezes  She has no rales  Abdominal: Soft  She exhibits no distension and no mass  There is no tenderness  There is no guarding  Musculoskeletal: Normal range of motion  Lymphadenopathy:     She has no cervical adenopathy  Neurological: She is alert and oriented to person, place, and time  No cranial nerve deficit  She exhibits normal muscle tone  Coordination normal    Skin: Skin is warm and dry  No rash noted  No erythema  Psychiatric: She has a normal mood and affect  Her behavior is normal  Judgment and thought content normal    Nursing note and vitals reviewed

## 2020-01-25 NOTE — PATIENT INSTRUCTIONS
1   Ice to scalp area:  10 min on 10 mins off three times daily  2  If onset of blurred vision, nausea, vomiting, instability of gait, worsening headache, go directly to ER  Contusion in Adults   WHAT YOU NEED TO KNOW:   A contusion is a bruise that appears on your skin after an injury  A bruise happens when small blood vessels tear but skin does not  When blood vessels tear, blood leaks into nearby tissue, such as soft tissue or muscle  DISCHARGE INSTRUCTIONS:   Return to the emergency department if:   · You have new trouble moving the injured area  · You have tingling or numbness in or near the injured area  · Your hand or foot below the bruise gets cold or turns pale  Contact your healthcare provider if:   · You find a new lump in the injured area  · Your symptoms do not improve with treatment after 4 to 5 days  · You have questions or concerns about your condition or care  Medicines: You may need any of the following:  · NSAIDs  help decrease swelling and pain or fever  This medicine is available with or without a doctor's order  NSAIDs can cause stomach bleeding or kidney problems in certain people  If you take blood thinner medicine, always ask your healthcare provider if NSAIDs are safe for you  Always read the medicine label and follow directions  · Prescription pain medicine  may be given  Do not wait until the pain is severe before you take your medicine  · Take your medicine as directed  Contact your healthcare provider if you think your medicine is not helping or if you have side effects  Tell him of her if you are allergic to any medicine  Keep a list of the medicines, vitamins, and herbs you take  Include the amounts, and when and why you take them  Bring the list or the pill bottles to follow-up visits  Carry your medicine list with you in case of an emergency  Follow up with your healthcare provider as directed:   You may need to return within a week to check your injury again  Write down your questions so you remember to ask them during your visits  Help a contusion heal:   · Rest the injured area  or use it less than usual  If you bruised your leg or foot, you may need crutches or a cane to help you walk  This will help you keep weight off your injured body part  · Apply ice  to decrease swelling and pain  Ice may also help prevent tissue damage  Use an ice pack, or put crushed ice in a plastic bag  Cover it with a towel and place it on your bruise for 15 to 20 minutes every hour or as directed  · Use compression  to support the area and decrease swelling  Wrap an elastic bandage around the area over the bruised muscle  Make sure the bandage is not too tight  You should be able to fit 1 finger between the bandage and your skin  · Elevate (raise) your injured body part  above the level of your heart to help decrease pain and swelling  Use pillows, blankets, or rolled towels to elevate the area as often as you can  · Do not drink alcohol  as directed  Alcohol may slow healing  · Do not stretch injured muscles  right after your injury  Ask your healthcare provider when and how you may safely stretch after your injury  Gentle stretches can help increase your flexibility  · Do not massage the area or put heating pads  on the bruise right after your injury  Heat and massage may slow healing  Your healthcare provider may tell you to apply heat after several days  At that time, heat will start to help the injury heal   Prevent another contusion:   · Stretch and warm up before you play sports or exercise  · Wear protective gear when you play sports  Examples are shin guards and padding  · If you begin a new physical activity, start slowly to give your body a chance to adjust   © 2017 [employer] Information is for End User's use only and may not be sold, redistributed or otherwise used for commercial purposes   All illustrations and images included in Trip are the copyrighted property of [employer]  or [employer]  The above information is an  only  It is not intended as medical advice for individual conditions or treatments  Talk to your doctor, nurse or pharmacist before following any medical regimen to see if it is safe and effective for you

## 2020-07-28 ENCOUNTER — DOCTOR'S OFFICE (OUTPATIENT)
Dept: URBAN - NONMETROPOLITAN AREA CLINIC 1 | Facility: CLINIC | Age: 60
Setting detail: OPHTHALMOLOGY
End: 2020-07-28
Payer: COMMERCIAL

## 2020-07-28 ENCOUNTER — RX ONLY (RX ONLY)
Age: 60
End: 2020-07-28

## 2020-07-28 DIAGNOSIS — H43.393: ICD-10-CM

## 2020-07-28 DIAGNOSIS — H25.013: ICD-10-CM

## 2020-07-28 DIAGNOSIS — H43.813: ICD-10-CM

## 2020-07-28 DIAGNOSIS — E11.9: ICD-10-CM

## 2020-07-28 PROCEDURE — 92004 COMPRE OPH EXAM NEW PT 1/>: CPT | Performed by: OPTOMETRIST

## 2020-07-28 ASSESSMENT — REFRACTION_CURRENTRX
OS_ADD: +2.00
OD_CYLINDER: -0.75
OS_CYLINDER: -0.50
OS_VPRISM_DIRECTION: PROGS
OD_AXIS: 133
OS_SPHERE: -5.50
OD_ADD: +2.00
OD_OVR_VA: 20/
OS_OVR_VA: 20/
OS_AXIS: 4
OD_VPRISM_DIRECTION: PROGS
OD_SPHERE: -5.00

## 2020-07-28 ASSESSMENT — REFRACTION_AUTOREFRACTION
OD_SPHERE: -4.50
OD_CYLINDER: -0.25
OS_CYLINDER: 0.00
OD_AXIS: 64
OS_SPHERE: -5.50
OS_AXIS: 180

## 2020-07-28 ASSESSMENT — SPHEQUIV_DERIVED
OS_SPHEQUIV: -5.5
OD_SPHEQUIV: -4.625

## 2020-07-28 ASSESSMENT — VISUAL ACUITY
OS_BCVA: 20/20-2
OD_BCVA: 20/25-1

## 2020-07-28 ASSESSMENT — CONFRONTATIONAL VISUAL FIELD TEST (CVF)
OD_FINDINGS: FULL
OS_FINDINGS: FULL

## 2020-08-14 ENCOUNTER — DOCTOR'S OFFICE (OUTPATIENT)
Dept: URBAN - NONMETROPOLITAN AREA CLINIC 1 | Facility: CLINIC | Age: 60
Setting detail: OPHTHALMOLOGY
End: 2020-08-14
Payer: COMMERCIAL

## 2020-08-14 ENCOUNTER — OPTICAL OFFICE (OUTPATIENT)
Dept: URBAN - NONMETROPOLITAN AREA CLINIC 4 | Facility: CLINIC | Age: 60
Setting detail: OPHTHALMOLOGY
End: 2020-08-14
Payer: COMMERCIAL

## 2020-08-14 DIAGNOSIS — H52.223: ICD-10-CM

## 2020-08-14 DIAGNOSIS — H52.4: ICD-10-CM

## 2020-08-14 DIAGNOSIS — H52.13: ICD-10-CM

## 2020-08-14 PROCEDURE — 92015 DETERMINE REFRACTIVE STATE: CPT | Performed by: OPTOMETRIST

## 2020-08-14 PROCEDURE — V2781 PROGRESSIVE LENS PER LENS: HCPCS | Performed by: OPTOMETRIST

## 2020-08-14 PROCEDURE — V2784 LENS POLYCARB OR EQUAL: HCPCS | Performed by: OPTOMETRIST

## 2020-08-14 PROCEDURE — V2020 VISION SVCS FRAMES PURCHASES: HCPCS | Performed by: OPTOMETRIST

## 2020-08-14 PROCEDURE — 92012 INTRM OPH EXAM EST PATIENT: CPT | Performed by: OPTOMETRIST

## 2020-08-14 PROCEDURE — V2203 LENS SPHCYL BIFOCAL 4.00D/.1: HCPCS | Performed by: OPTOMETRIST

## 2020-08-14 ASSESSMENT — REFRACTION_AUTOREFRACTION
OD_CYLINDER: -1.75
OS_AXIS: 74
OS_SPHERE: -4.25
OS_CYLINDER: -1.00
OD_AXIS: 106
OD_SPHERE: -3.25

## 2020-08-14 ASSESSMENT — REFRACTION_MANIFEST
OD_AXIS: 105
OD_VA1: 20/25
OS_VA1: 20/25
OS_ADD: +2.50
OS_AXIS: 075
OD_ADD: +2.50
OS_SPHERE: -4.00
OD_SPHERE: -3.75
OD_VA2: 20/25
OS_VA2: 20/25
OD_CYLINDER: -1.00
OS_CYLINDER: -1.00

## 2020-08-14 ASSESSMENT — REFRACTION_CURRENTRX
OD_VPRISM_DIRECTION: PROGS
OS_SPHERE: -4.00
OD_CYLINDER: -1.00
OS_OVR_VA: 20/
OS_AXIS: 131
OS_VPRISM_DIRECTION: PROGS
OD_SPHERE: -3.75
OD_AXIS: 124
OS_CYLINDER: -0.50
OD_OVR_VA: 20/
OS_ADD: +2.00
OD_ADD: +2.00

## 2020-08-14 ASSESSMENT — SPHEQUIV_DERIVED
OD_SPHEQUIV: -4.25
OS_SPHEQUIV: -4.75
OS_SPHEQUIV: -4.5
OD_SPHEQUIV: -4.125

## 2020-08-14 ASSESSMENT — VISUAL ACUITY
OS_BCVA: 20/40+2
OD_BCVA: 20/25+1

## 2020-09-28 ENCOUNTER — DOCTOR'S OFFICE (OUTPATIENT)
Dept: URBAN - NONMETROPOLITAN AREA CLINIC 1 | Facility: CLINIC | Age: 60
Setting detail: OPHTHALMOLOGY
End: 2020-09-28
Payer: COMMERCIAL

## 2020-09-28 DIAGNOSIS — E11.3293: ICD-10-CM

## 2020-09-28 DIAGNOSIS — H52.4: ICD-10-CM

## 2020-09-28 DIAGNOSIS — H52.13: ICD-10-CM

## 2020-09-28 PROCEDURE — 92134 CPTRZ OPH DX IMG PST SGM RTA: CPT | Performed by: OPTOMETRIST

## 2020-09-28 PROCEDURE — NCRX N/C GLASSES RX: Performed by: OPTOMETRIST

## 2020-09-28 ASSESSMENT — REFRACTION_MANIFEST
OS_VA1: 20/40
OS_CYLINDER: -0.25
OD_VA1: 20/25
OS_SPHERE: -5.50
OD_AXIS: 100
OD_SPHERE: -5.00
OS_SPHERE: -6.00
OS_ADD: +2.50
OD_ADD: +2.50
OD_AXIS: 120
OS_VA1: 20/25
OD_SPHERE: -4.50
OD_CYLINDER: -0.75
OD_VA2: 20/25
OS_AXIS: 080
OS_VA2: 20/25
OD_CYLINDER: -0.75
OS_CYLINDER: SPH
OD_VA1: 20/40

## 2020-09-28 ASSESSMENT — REFRACTION_CURRENTRX
OS_CYLINDER: -1.00
OS_CYLINDER: SPH
OD_SPHERE: -4.75
OD_SPHERE: -3.75
OS_ADD: +2.00
OD_ADD: +2.00
OS_SPHERE: -3.75
OS_ADD: +2.50
OS_AXIS: 84
OD_AXIS: 118
OD_OVR_VA: 20/
OD_VPRISM_DIRECTION: PROGS
OS_VPRISM_DIRECTION: PROGS
OD_ADD: +2.50
OD_CYLINDER: -1.00
OS_OVR_VA: 20/
OD_CYLINDER: -0.75
OS_SPHERE: -5.50
OS_OVR_VA: 20/
OD_AXIS: 119
OD_OVR_VA: 20/
OS_VPRISM_DIRECTION: PROGS
OD_VPRISM_DIRECTION: PROGS

## 2020-09-28 ASSESSMENT — SPHEQUIV_DERIVED
OD_SPHEQUIV: -5.375
OS_SPHEQUIV: -5.625
OD_SPHEQUIV: -4.875
OS_SPHEQUIV: -6.125
OD_SPHEQUIV: -4.75

## 2020-09-28 ASSESSMENT — REFRACTION_AUTOREFRACTION
OS_AXIS: 159
OD_CYLINDER: -0.50
OD_AXIS: 129
OD_SPHERE: -4.50
OS_SPHERE: -5.25
OS_CYLINDER: -0.75

## 2020-09-28 ASSESSMENT — VISUAL ACUITY
OD_BCVA: 20/60
OS_BCVA: 20/60-2

## 2021-09-20 ENCOUNTER — DOCTOR'S OFFICE (OUTPATIENT)
Dept: URBAN - NONMETROPOLITAN AREA CLINIC 1 | Facility: CLINIC | Age: 61
Setting detail: OPHTHALMOLOGY
End: 2021-09-20
Payer: COMMERCIAL

## 2021-09-20 DIAGNOSIS — H25.013: ICD-10-CM

## 2021-09-20 DIAGNOSIS — E11.3293: ICD-10-CM

## 2021-09-20 DIAGNOSIS — H43.813: ICD-10-CM

## 2021-09-20 DIAGNOSIS — H43.393: ICD-10-CM

## 2021-09-20 DIAGNOSIS — H52.13: ICD-10-CM

## 2021-09-20 PROCEDURE — 92134 CPTRZ OPH DX IMG PST SGM RTA: CPT | Performed by: OPTOMETRIST

## 2021-09-20 PROCEDURE — 92014 COMPRE OPH EXAM EST PT 1/>: CPT | Performed by: OPTOMETRIST

## 2021-09-20 ASSESSMENT — REFRACTION_CURRENTRX
OS_CYLINDER: 0.00
OS_ADD: +2.00
OS_OVR_VA: 20/
OD_AXIS: 127
OD_SPHERE: -4.75
OD_ADD: +2.50
OS_VPRISM_DIRECTION: PROGS
OD_AXIS: 119
OD_VPRISM_DIRECTION: PROGS
OS_OVR_VA: 20/
OD_SPHERE: -4.50
OS_CYLINDER: SPH
OS_AXIS: 180
OS_SPHERE: -5.50
OD_CYLINDER: -1.00
OS_SPHERE: -5.50
OD_OVR_VA: 20/
OS_VPRISM_DIRECTION: PROGS
OD_CYLINDER: -0.75
OS_ADD: +2.50
OD_ADD: +2.00
OD_VPRISM_DIRECTION: PROGS
OD_OVR_VA: 20/

## 2021-09-20 ASSESSMENT — TONOMETRY
OD_IOP_MMHG: 14
OS_IOP_MMHG: 14

## 2021-09-20 ASSESSMENT — REFRACTION_MANIFEST
OD_SPHERE: -4.50
OS_SPHERE: -6.00
OD_VA1: 20/40
OS_SPHERE: -5.50
OD_AXIS: 120
OD_VA1: 20/25
OD_CYLINDER: -0.75
OS_AXIS: 080
OS_VA1: 20/25
OD_AXIS: 100
OS_CYLINDER: SPH
OD_VA2: 20/25
OS_ADD: +2.50
OS_CYLINDER: -0.25
OD_CYLINDER: -0.75
OS_VA2: 20/25
OS_VA1: 20/40
OD_SPHERE: -5.00
OD_ADD: +2.50

## 2021-09-20 ASSESSMENT — REFRACTION_AUTOREFRACTION
OD_SPHERE: -4.50
OD_CYLINDER: -0.50
OD_AXIS: 111
OS_CYLINDER: 0.00
OS_SPHERE: -5.25
OS_AXIS: 000

## 2021-09-20 ASSESSMENT — CONFRONTATIONAL VISUAL FIELD TEST (CVF)
OS_FINDINGS: FULL
OD_FINDINGS: FULL

## 2021-09-20 ASSESSMENT — SPHEQUIV_DERIVED
OS_SPHEQUIV: -6.125
OD_SPHEQUIV: -4.75
OD_SPHEQUIV: -5.375
OS_SPHEQUIV: -5.25
OD_SPHEQUIV: -4.875

## 2021-09-20 ASSESSMENT — VISUAL ACUITY
OD_BCVA: 20/30+2
OS_BCVA: 20/25-1

## 2022-11-21 ENCOUNTER — DOCTOR'S OFFICE (OUTPATIENT)
Dept: URBAN - NONMETROPOLITAN AREA CLINIC 1 | Facility: CLINIC | Age: 62
Setting detail: OPHTHALMOLOGY
End: 2022-11-21
Payer: COMMERCIAL

## 2022-11-21 DIAGNOSIS — H43.393: ICD-10-CM

## 2022-11-21 DIAGNOSIS — E11.3293: ICD-10-CM

## 2022-11-21 DIAGNOSIS — H25.013: ICD-10-CM

## 2022-11-21 DIAGNOSIS — H43.813: ICD-10-CM

## 2022-11-21 PROBLEM — H25.13 CATARACT NUCLEAR SCLEROSIS; BOTH EYES: Status: ACTIVE | Noted: 2022-11-21

## 2022-11-21 PROCEDURE — 92014 COMPRE OPH EXAM EST PT 1/>: CPT | Performed by: OPTOMETRIST

## 2022-11-21 ASSESSMENT — REFRACTION_MANIFEST
OS_VA1: 20/25
OD_VA1: 20/25
OS_CYLINDER: -0.25
OD_SPHERE: -5.00
OD_AXIS: 120
OS_VA2: 20/25
OD_AXIS: 100
OD_VA1: 20/40
OS_AXIS: 080
OD_CYLINDER: -0.75
OS_VA1: 20/40
OS_CYLINDER: SPH
OS_SPHERE: -5.50
OS_ADD: +2.50
OD_SPHERE: -4.50
OD_VA2: 20/25
OD_CYLINDER: -0.75
OD_ADD: +2.50
OS_SPHERE: -6.00

## 2022-11-21 ASSESSMENT — CONFRONTATIONAL VISUAL FIELD TEST (CVF)
OD_FINDINGS: FULL
OS_FINDINGS: FULL

## 2022-11-21 ASSESSMENT — REFRACTION_CURRENTRX
OD_CYLINDER: -0.75
OS_OVR_VA: 20/
OS_CYLINDER: 0.00
OS_ADD: +2.50
OS_AXIS: 180
OD_OVR_VA: 20/
OD_SPHERE: -4.50
OS_CYLINDER: SPH
OS_OVR_VA: 20/
OS_ADD: +2.00
OS_VPRISM_DIRECTION: PROGS
OS_SPHERE: -5.50
OD_VPRISM_DIRECTION: PROGS
OD_OVR_VA: 20/
OS_VPRISM_DIRECTION: PROGS
OD_SPHERE: -4.75
OD_AXIS: 119
OD_CYLINDER: -1.00
OS_SPHERE: -5.50
OD_ADD: +2.50
OD_VPRISM_DIRECTION: PROGS
OD_AXIS: 127
OD_ADD: +2.00

## 2022-11-21 ASSESSMENT — TONOMETRY
OS_IOP_MMHG: 15
OD_IOP_MMHG: 15

## 2022-11-21 ASSESSMENT — SPHEQUIV_DERIVED
OD_SPHEQUIV: -4.875
OS_SPHEQUIV: -6.125
OD_SPHEQUIV: -5.375
OD_SPHEQUIV: -4.875

## 2022-11-21 ASSESSMENT — REFRACTION_AUTOREFRACTION
OD_CYLINDER: -0.75
OD_SPHERE: -4.50
OD_AXIS: 109
OS_SPHERE: -5.50

## 2022-11-21 ASSESSMENT — VISUAL ACUITY
OS_BCVA: 20/25-1
OD_BCVA: 20/25-1

## 2025-07-21 ENCOUNTER — DOCTOR'S OFFICE (OUTPATIENT)
Dept: URBAN - NONMETROPOLITAN AREA CLINIC 1 | Facility: CLINIC | Age: 65
Setting detail: OPHTHALMOLOGY
End: 2025-07-21
Payer: COMMERCIAL

## 2025-07-21 DIAGNOSIS — H43.393: ICD-10-CM

## 2025-07-21 DIAGNOSIS — H43.813: ICD-10-CM

## 2025-07-21 DIAGNOSIS — E11.3293: ICD-10-CM

## 2025-07-21 DIAGNOSIS — H25.013: ICD-10-CM

## 2025-07-21 DIAGNOSIS — H25.042: ICD-10-CM

## 2025-07-21 PROBLEM — H25.13 CATARACT NUCLEAR SCLEROSIS; BOTH EYES: Status: ACTIVE | Noted: 2025-07-21

## 2025-07-21 PROCEDURE — 92134 CPTRZ OPH DX IMG PST SGM RTA: CPT | Performed by: OPTOMETRIST

## 2025-07-21 PROCEDURE — 92014 COMPRE OPH EXAM EST PT 1/>: CPT | Performed by: OPTOMETRIST

## 2025-07-21 ASSESSMENT — REFRACTION_MANIFEST
OS_AXIS: 080
OS_VA1: 20/40
OD_ADD: +2.50
OS_VA1: 20/40
OS_VA2: 20/40
OS_SPHERE: -6.25
OD_AXIS: 100
OS_CYLINDER: -0.25
OD_SPHERE: -4.25
OD_CYLINDER: -0.75
OD_AXIS: 125
OS_SPHERE: -6.00
OD_VA1: 20/30-2
OD_VA1: 20/40
OS_AXIS: 075
OS_CYLINDER: -0.50
OD_CYLINDER: -0.50
OS_ADD: +2.50
OD_VA2: 20/30-2
OD_SPHERE: -5.00

## 2025-07-21 ASSESSMENT — REFRACTION_CURRENTRX
OD_OVR_VA: 20/
OS_AXIS: 016
OD_CYLINDER: -1.00
OS_CYLINDER: SPH
OS_ADD: +2.00
OD_OVR_VA: 20/
OS_SPHERE: -5.50
OS_VPRISM_DIRECTION: PROGS
OD_ADD: +2.00
OD_CYLINDER: -0.50
OD_SPHERE: -4.75
OS_VPRISM_DIRECTION: PROGS
OD_AXIS: 119
OS_CYLINDER: -0.25
OS_ADD: +2.50
OS_OVR_VA: 20/
OS_OVR_VA: 20/
OD_VPRISM_DIRECTION: PROGS
OD_SPHERE: -4.75
OD_AXIS: 122
OS_SPHERE: -5.50
OD_ADD: +2.50
OD_VPRISM_DIRECTION: PROGS

## 2025-07-21 ASSESSMENT — REFRACTION_AUTOREFRACTION
OS_AXIS: 073
OD_CYLINDER: -0.25
OD_AXIS: 127
OD_SPHERE: -4.00
OS_CYLINDER: -0.50
OS_SPHERE: -6.50

## 2025-07-21 ASSESSMENT — TONOMETRY
OS_IOP_MMHG: 15
OD_IOP_MMHG: 15

## 2025-07-21 ASSESSMENT — CONFRONTATIONAL VISUAL FIELD TEST (CVF)
OD_FINDINGS: FULL
OS_FINDINGS: FULL

## 2025-07-21 ASSESSMENT — VISUAL ACUITY
OS_BCVA: 20/30-2
OD_BCVA: 20/80

## 2025-08-27 ENCOUNTER — DOCTOR'S OFFICE (OUTPATIENT)
Dept: URBAN - NONMETROPOLITAN AREA CLINIC 1 | Facility: CLINIC | Age: 65
Setting detail: OPHTHALMOLOGY
End: 2025-08-27
Payer: COMMERCIAL

## 2025-08-27 DIAGNOSIS — H43.813: ICD-10-CM

## 2025-08-27 DIAGNOSIS — H25.13: ICD-10-CM

## 2025-08-27 DIAGNOSIS — E11.3293: ICD-10-CM

## 2025-08-27 DIAGNOSIS — H35.373: ICD-10-CM

## 2025-08-27 DIAGNOSIS — H25.043: ICD-10-CM

## 2025-08-27 PROCEDURE — 92134 CPTRZ OPH DX IMG PST SGM RTA: CPT | Performed by: OPHTHALMOLOGY

## 2025-08-27 PROCEDURE — 99214 OFFICE O/P EST MOD 30 MIN: CPT | Performed by: OPHTHALMOLOGY

## 2025-08-27 ASSESSMENT — REFRACTION_CURRENTRX
OS_SPHERE: -5.50
OS_OVR_VA: 20/
OS_CYLINDER: SPH
OS_OVR_VA: 20/
OS_ADD: +2.25
OS_CYLINDER: 0.00
OS_VPRISM_DIRECTION: PROGS
OD_VPRISM_DIRECTION: PROGS
OD_ADD: +2.25
OD_CYLINDER: -1.00
OS_SPHERE: -5.50
OD_ADD: +2.00
OD_AXIS: 107
OD_SPHERE: -4.75
OD_CYLINDER: -0.75
OS_AXIS: 180
OD_OVR_VA: 20/
OD_VPRISM_DIRECTION: PROGS
OD_SPHERE: -4.75
OS_VPRISM_DIRECTION: PROGS
OD_AXIS: 119
OD_OVR_VA: 20/
OS_ADD: +2.00

## 2025-08-27 ASSESSMENT — REFRACTION_MANIFEST
OS_SPHERE: -6.00
OD_SPHERE: -5.00
OS_VA1: 20/40
OD_AXIS: 100
OD_VA1: 20/40
OD_AXIS: 125
OS_CYLINDER: -0.50
OS_ADD: +2.50
OS_CYLINDER: -0.25
OD_CYLINDER: -0.75
OD_CYLINDER: -0.50
OD_ADD: +2.50
OS_VA2: 20/40
OS_AXIS: 080
OD_VA1: 20/30-2
OS_SPHERE: -6.25
OD_SPHERE: -4.25
OS_AXIS: 075
OD_VA2: 20/30-2
OS_VA1: 20/40

## 2025-08-27 ASSESSMENT — KERATOMETRY
OS_K1POWER_DIOPTERS: 44.75
OD_K1POWER_DIOPTERS: 44.25
OS_K2POWER_DIOPTERS: 45.50
OS_AXISANGLE_DEGREES: 077
OD_K2POWER_DIOPTERS: 44.25

## 2025-08-27 ASSESSMENT — REFRACTION_AUTOREFRACTION
OS_AXIS: 154
OS_SPHERE: -6.50
OD_CYLINDER: -0.50
OD_AXIS: 121
OS_CYLINDER: -0.75
OD_SPHERE: -4.25

## 2025-08-27 ASSESSMENT — CONFRONTATIONAL VISUAL FIELD TEST (CVF)
OS_FINDINGS: FULL
OD_FINDINGS: FULL

## 2025-08-27 ASSESSMENT — VISUAL ACUITY
OS_BCVA: 20/30-2
OD_BCVA: 20/60